# Patient Record
Sex: FEMALE | Race: WHITE | Employment: FULL TIME | URBAN - METROPOLITAN AREA
[De-identification: names, ages, dates, MRNs, and addresses within clinical notes are randomized per-mention and may not be internally consistent; named-entity substitution may affect disease eponyms.]

---

## 2018-03-07 PROBLEM — R60.1 ANASARCA: Status: ACTIVE | Noted: 2018-03-07

## 2018-06-14 ENCOUNTER — HOSPITAL ENCOUNTER (OUTPATIENT)
Dept: PHYSICAL THERAPY | Age: 48
Discharge: HOME | End: 2018-06-14
Payer: COMMERCIAL

## 2018-06-14 PROCEDURE — 97140 MANUAL THERAPY 1/> REGIONS: CPT

## 2018-06-14 PROCEDURE — 97161 PT EVAL LOW COMPLEX 20 MIN: CPT

## 2018-06-14 NOTE — PROGRESS NOTES
[facility] [address]  [address] [facility] [address] [facility], [place] [facility] [place], [address] - Phone: [phone]  Fax: [phone] / [address] [facility]  Patient Name: Wilfrido Mesa : 1970   Medical   Diagnosis: Leg pain [M79.606] Treatment Diagnosis: B diabetic neuropathy of feet   Onset Date:      Referral Source: Charis Pineda MD Start of Care [facility]): 2018   Prior Hospitalization: See medical history Provider #: [id number]   Prior Level of Function: Chronic, worsening pain in feet   Comorbidities: HBP, Thyroid problems, DM, Depression   Medications: Verified on Patient Summary List   The Plan of Care and following information is based on the information from the initial evaluation.   ==============================================================================  Assessment / larry information:   Wilfrido Mesa is a 50 y.o. female with a chief c/o constant B foot pain, up to 8/10 and B foot sensation changes (pins/needles). She reports insidious onset of B foot pain, about 3 years ago. Her pain has been increasing. Pain is worse in the evening (limiting sleep), or with prolonged standing. Her basic foot structure looks good, but with excessive L foot subtalar inversion, lacking proper eversion. Her calf length is also limited, with limited AROM/PROM with DF, with 5/12 degrees of DF L and 7/14 degrees of DF R. Today we did DTM to B calves and feet, then stretched her calves, which reduced her pain.   The patient would benefit from skilled physical therapy at this time.  ===========================================================================================  Eval Complexity: History LOW Complexity : Zero comorbidities / personal factors that will impact the outcome / POC;  Examination  MEDIUM Complexity : 3 Standardized tests and measures addressing body structure, function, activity limitation and / or participation in recreation ; Presentation LOW Complexity : Stable, uncomplicated ;  Decision Making MEDIUM Complexity : FOTO score of 26-74; Overall Complexity LOW   Problem List: pain affecting function, decrease ROM, impaired gait/ balance, decrease ADL/ functional abilitiies, decrease activity tolerance and decrease flexibility/ joint mobility   Treatment Plan may include any combination of the following: Therapeutic exercise, Therapeutic activities, Neuromuscular re-education, Physical agent/modality, Gait/balance training, Manual therapy and Patient education  Patient / Family readiness to learn indicated by: asking questions, trying to perform skills and interest  Persons(s) to be included in education: patient (P)  Barriers to Learning/Limitations: None  Measures taken:    Patient Goal (s): \"less foot pain\"   Patient self reported health status: good  Rehabilitation Potential: good   Short Term Goals: To be accomplished in  2  weeks:  1. Pt compliant with stretching HEP  2. Decrease max B calf/foot pain to < = 5/10    Long Term Goals: To be accomplished in  4-5  weeks:  1. All ADLs without B foot pain > 3/10  2. Pt able to sleep through the night without B foot pain awakening her  3. Pt to report GROC of >= +4, to indicate increased function  4. Patient Independent with HEP/selfcare   Frequency / Duration:   Patient to be seen 2-3  times per week for 4-8  weeks:  Patient / Caregiver education and instruction: self care, activity modification and exercises  Therapist Signature: Prem Contreras PT, MDT Date: 1/69/8269   Certification Period: NA Time: 4:59 PM   ===========================================================================================  I certify that the above Physical Therapy Services are being furnished while the patient is under my care. I agree with the treatment plan and certify that this therapy is necessary.     Physician Signature:        Date:       Time:     Please sign and return to [facility] [place] or you may fax the signed copy to [phone]. Thank you.

## 2018-06-14 NOTE — PROGRESS NOTES
PHYSICAL THERAPY - DAILY TREATMENT NOTE    Patient Name: Michael Smith        Date: 2018  : 1970   YES Patient  Verified  Visit #:     Insurance: Payor: /      In time: 4:05 Out time: 4:45   Total Treatment Time: 40     Medicare Time Tracking (below)   Total Timed Codes (min):  20 1:1 Treatment Time:  40     TREATMENT AREA =  B feet    SUBJECTIVE    Pain Level (on 0 to 10 scale):  6  / 10   Medication Changes/New allergies or changes in medical history, any new surgeries or procedures? NO    If yes, update Summary List   Subjective Functional Status/Changes:  []  No changes reported     See eval          OBJECTIVE    Modality rationale:       min [] Estim, type:                                          []  att     []  unatt     []  w/US     []  w/ice    []  w/heat    min []  Mechanical Traction: type/lbs                                               []  pro   []  sup   []  int   []  cont    min []  Ultrasound, settings/location:      min []  Iontophoresis:  []  take home patch w/ dexamethazone    min                                []  in clinic w/ dexamethazone    min []  Ice     []  Heat     position:     min []  Other:      5 min Therapeutic Exercise:  [x]  See flow sheet   []  Other:      []  Added:     to improve (function):    []  Changed:     to improve (function):       min Therapeutic Activity:      15 min Manual Therapy: DTM to calves, B ankle DF stretch, DTM to feet       min Gait Training:       min Patient Education:  YES  Reviewed HEP   []  Progressed/Changed HEP based on:         Other Objective/Functional Measures:    See eval     Post Treatment Pain Level (on 0 to 10) scale:   4  / 10     ASSESSMENT    []  See Progress Note/Recertification   Patient will continue to benefit from skilled therapy to address remaining functional deficits: see eval   Progress toward goals / Updated goals:    -     PLAN    [x]  Upgrade activities as tolerated YES Continue plan of care   [] Discharge due to :    []  Other:      Therapist: Bessie Jimenez PT    Date: 6/14/2018 Time: 8:13 AM

## 2018-06-18 ENCOUNTER — HOSPITAL ENCOUNTER (OUTPATIENT)
Dept: PHYSICAL THERAPY | Age: 48
Discharge: HOME | End: 2018-06-18
Payer: COMMERCIAL

## 2018-06-18 PROCEDURE — 97140 MANUAL THERAPY 1/> REGIONS: CPT

## 2018-06-18 PROCEDURE — 97110 THERAPEUTIC EXERCISES: CPT

## 2018-06-18 NOTE — PROGRESS NOTES
PHYSICAL THERAPY - DAILY TREATMENT NOTE      Patient Name: Mayela Going        Date: 2018  : 1970   YES Patient  Verified  Visit #:     Insurance: Payor: Carmen Severance / Plan: [facility] PPO / Product Type: PPO /      In time: 2:35 Out time: 3:15   Total Treatment Time: 40     Medicare Time Tracking (below)   Total Timed Codes (min):  n/a 1:1 Treatment Time:  n/a     TREATMENT AREA =  Leg pain [M79.606]    SUBJECTIVE    Pain Level (on 0 to 10 scale):  2  / 10   Medication Changes/New allergies or changes in medical history, any new surgeries or procedures? NO    If yes, update Summary List   Subjective Functional Status/Changes:  []  No changes reported       Functional improvements: sleep  Functional impairments: standing         OBJECTIVE    25 min Therapeutic Exercise:  [x]  See flow sheet   Rationale:      increase ROM and increase strength to improve the patients ability to stand     15 min Manual Therapy: Technique:      [] S/DTM []IASTM []PROM [] Passive Stretching   []manual TPR    []Jt manipulation:Gr I [] II []  III [] IV[] V[]  Treatment Area:  Prone DTM bilateral gastroc/solues, plantar foot   Rationale:      decrease pain and increase tissue extensibility to improve patient's ability to stand       min Patient Education:  YES  Reviewed HEP   []  Progressed/Changed HEP based on: Other Objective/Functional Measures:    Patient able to tolerate therex without symptom exacerbation.      Post Treatment Pain Level (on 0 to 10) scale:   0.5  / 10     ASSESSMENT    Assessment/Changes in Function:     Patient reported improved stiffness in LEs.     []  See Progress Note/Recertification   Patient will continue to benefit from skilled PT services to modify and progress therapeutic interventions, address functional mobility deficits, address ROM deficits, address strength deficits, analyze and address soft tissue restrictions, analyze and cue movement patterns, analyze and modify body mechanics/ergonomics and assess and modify postural abnormalities to attain remaining goals. to attain remaining goals. Progress toward goals / Updated goals:    Progressing toward ROM goals.      PLAN    [x]  Upgrade activities as tolerated YES Continue plan of care   []  Discharge due to :    []  Other:      Therapist: Frankie Gonsalves PT    Date: 6/18/2018 Time: 2:55 PM   Future Appointments  Date Time Provider Trang Lee   6/21/2018 2:30 PM Constantino Crane, PT [facility] AT [facility]   6/26/2018 9:30 AM Shakeel Mclean PT [facility] AT [facility]   6/28/2018 12:30 PM Christelle Gentile, PT [facility] AT [facility]   7/2/2018 2:00 PM Frankie Gonsalves, PT [facility] AT [facility]   7/5/2018 12:30 PM Christelle Gentile PT [facility] AT [facility]   7/9/2018 2:00 PM Frankie Gonsalves PT [facility] AT [facility]   7/12/2018 12:30 PM Christelle Gentile PT RE[facility]ENTER AT [facility]

## 2018-06-21 ENCOUNTER — HOSPITAL ENCOUNTER (OUTPATIENT)
Dept: PHYSICAL THERAPY | Age: 48
Discharge: HOME | End: 2018-06-21
Payer: COMMERCIAL

## 2018-06-21 PROCEDURE — 97110 THERAPEUTIC EXERCISES: CPT

## 2018-06-21 PROCEDURE — 97140 MANUAL THERAPY 1/> REGIONS: CPT

## 2018-06-21 NOTE — PROGRESS NOTES
PHYSICAL THERAPY - DAILY TREATMENT NOTE      Patient Name: Mau Ruff        Date: 2018  : 1970   YES Patient  Verified  Visit #:   3   of   12  Insurance: Payor: Juju Bouquet / Plan: [facility] PPO / Product Type: PPO /      In time: 2:42 Out time: 3:32   Total Treatment Time: 50       TREATMENT AREA = Leg pain [M79.606]    SUBJECTIVE    Pain Level (on 0 to 10 scale):  3   10   Medication Changes/New allergies or changes in medical history, any new surgeries or procedures? NO    If yes, update Summary List   Subjective Functional Status/Changes:  []  No changes reported     No significant changes in function       OBJECTIVE    35 min Therapeutic Exercise:  [x]  See flow sheet   Rationale:      increase ROM and increase strength to improve the patients ability to perform all LE ADL's       15 min Manual Therapy:  B gastroc/soleus/plantar foot STM   Rationale:      decrease pain and increase tissue extensibility to improve patient's ability to perform all LE ADL's     Throughout Tx min Patient Education:  YES  Reviewed HEP   []  Progressed/Changed HEP based on: Other Objective/Functional Measures:    Increased reps of prone hip extension, S/l hip ABD, clams, bridges, and squats     Post Treatment Pain Level (on 0 to 10) scale:   2   10     ASSESSMENT    Assessment/Changes in Function:     No increased pain with progression of therex     []  See Progress Note/Recertification   Patient will continue to benefit from skilled PT services to modify and progress therapeutic interventions, address functional mobility deficits, address ROM deficits, address strength deficits, analyze and address soft tissue restrictions, analyze and cue movement patterns and analyze and modify body mechanics/ergonomics to attain remaining goals.    Progress toward goals / Updated goals:    Continue manual to meet all pain goals     PLAN    [x]  Upgrade activities as tolerated YES Continue plan of care   []  Discharge due to :    []  Other:      Therapist: Jimmy Hawley PT    Date: 6/21/2018 Time: 3:33 PM   Future Appointments  Date Time Provider Trang Lee   6/26/2018 9:30 AM Yanci Abreu, PT REHAB [facility]ENTER AT [facility]   6/28/2018 12:30 PM Nunu Cota, PT [facility]ENTER AT [facility]   7/2/2018 2:00 PM Wolfgang Mortimer, PT [facility]ENTER AT [facility]   7/5/2018 12:30 PM Nunu Cota PT REHAB CENTER AT [facility]   7/9/2018 2:00 PM Wolfgang Mortimer, PT [facility] AT [facility]   7/12/2018 12:30 PM Nunu Cota PT REHAB CENTER AT [facility]

## 2018-06-26 ENCOUNTER — HOSPITAL ENCOUNTER (OUTPATIENT)
Dept: PHYSICAL THERAPY | Age: 48
Discharge: HOME | End: 2018-06-26
Payer: COMMERCIAL

## 2018-06-26 PROCEDURE — 97110 THERAPEUTIC EXERCISES: CPT

## 2018-06-26 PROCEDURE — 97140 MANUAL THERAPY 1/> REGIONS: CPT

## 2018-06-26 NOTE — PROGRESS NOTES
PHYSICAL THERAPY - DAILY TREATMENT NOTE      Patient Name: Velma Sanchez        Date: 2018  : 1970   YES Patient  Verified  Visit #:     Insurance: Payor: Margarita Dejesus / Plan: [facility] PPO / Product Type: PPO /      In time: 9:56 Out time: 10:55   Total Treatment Time: 59       TREATMENT AREA = Leg pain [M79.606]    SUBJECTIVE    Pain Level (on 0 to 10 scale):  2-3  / 10   Medication Changes/New allergies or changes in medical history, any new surgeries or procedures? NO    If yes, update Summary List   Subjective Functional Status/Changes:  []  No changes reported     No significant changes in function       OBJECTIVE    39 min Therapeutic Exercise:  [x]  See flow sheet   Rationale:      increase ROM and increase strength to improve the patients ability to perform all LE ADL's       20 min Manual Therapy:  B gastroc/soleus IASTM. B medial arch foot STM   Rationale:      decrease pain to improve patient's ability to perform all LE ADL's    x min Patient Education:  YES  Reviewed HEP   []  Progressed/Changed HEP based on: Other Objective/Functional Measures: Tolerated and completed all therex     Post Treatment Pain Level (on 0 to 10) scale:   2  / 10     ASSESSMENT    Assessment/Changes in Function:     No increased pain during therex     []  See Progress Note/Recertification   Patient will continue to benefit from skilled PT services to modify and progress therapeutic interventions, address functional mobility deficits, address ROM deficits, address strength deficits, analyze and address soft tissue restrictions, analyze and cue movement patterns, analyze and modify body mechanics/ergonomics and assess and modify postural abnormalities to attain remaining goals.    Progress toward goals / Updated goals:    Continue strengthening to meet all goals     PLAN    [x]  Upgrade activities as tolerated YES Continue plan of care   []  Discharge due to :    [] Other:      Therapist: Suzanne Devries PT    Date: 6/26/2018 Time: 11:40 AM   Future Appointments  Date Time Provider Trang Lee   6/28/2018 12:30 PM Elizabeth Sharma, PT REHAB CENTER AT [address]   7/2/2018 2:00 PM Susan Willis, PT REHAB CENTER AT [address]   7/5/2018 12:30 PM Elizabeth Sharma, PT REHAB CENTER AT [address]   7/9/2018 2:00 PM Susan Pedro, PT REHAB CENTER AT [address]   7/12/2018 12:30 PM Elizabeth Sharma, PT REHAB CENTER AT [address]

## 2018-06-28 ENCOUNTER — HOSPITAL ENCOUNTER (OUTPATIENT)
Dept: PHYSICAL THERAPY | Age: 48
Discharge: HOME | End: 2018-06-28
Payer: COMMERCIAL

## 2018-06-28 PROCEDURE — 97140 MANUAL THERAPY 1/> REGIONS: CPT

## 2018-06-28 PROCEDURE — 97110 THERAPEUTIC EXERCISES: CPT

## 2018-07-02 ENCOUNTER — HOSPITAL ENCOUNTER (OUTPATIENT)
Dept: PHYSICAL THERAPY | Age: 48
Discharge: HOME | End: 2018-07-02
Payer: COMMERCIAL

## 2018-07-02 PROCEDURE — 97110 THERAPEUTIC EXERCISES: CPT

## 2018-07-02 PROCEDURE — 97140 MANUAL THERAPY 1/> REGIONS: CPT

## 2018-07-02 NOTE — PROGRESS NOTES
PHYSICAL THERAPY - DAILY TREATMENT NOTE      Patient Name: Isiah Lopez        Date: 2018  : 1970   YES Patient  Verified  Visit #:     Insurance: Payor: Tara Beard / Plan: [facility] PPO / Product Type: PPO /      In time: 2:05 Out time: 2:40   Total Treatment Time: 35     Medicare Time Tracking (below)   Total Timed Codes (min):  n/a 1:1 Treatment Time:  n/a     TREATMENT AREA =  Leg pain [M79.606]    SUBJECTIVE    Pain Level (on 0 to 10 scale):  2  / 10   Medication Changes/New allergies or changes in medical history, any new surgeries or procedures? NO    If yes, update Summary List   Subjective Functional Status/Changes:  []  No changes reported       Functional improvements: sleep  Functional impairments: standing         OBJECTIVE    25 min Therapeutic Exercise:  [x]  See flow sheet   Rationale:      increase ROM and increase strength to improve the patients ability to stand     10 min Manual Therapy: Technique:      [] S/DTM []IASTM []PROM [] Passive Stretching   []manual TPR    []Jt manipulation:Gr I [] II []  III [] IV[] V[]  Treatment Area:  DTM bilateral gastoc/soleus, plantar fascia   Rationale:      decrease pain, increase ROM and increase tissue extensibility to improve patient's ability to stand     min Patient Education:  YES  Reviewed HEP   []  Progressed/Changed HEP based on: Other Objective/Functional Measures:    Patient reporting overall improvement in foot pain. Post Treatment Pain Level (on 0 to 10) scale:   3  / 10     ASSESSMENT    Assessment/Changes in Function:     Patient increasing workout routine at gym with improvements in mobility noted.      []  See Progress Note/Recertification   Patient will continue to benefit from skilled PT services to modify and progress therapeutic interventions, address functional mobility deficits, address ROM deficits, address strength deficits, analyze and address soft tissue restrictions, analyze and cue movement patterns, analyze and modify body mechanics/ergonomics and assess and modify postural abnormalities to attain remaining goals. to attain remaining goals. Progress toward goals / Updated goals:    Progressing toward pain goals.      PLAN    [x]  Upgrade activities as tolerated YES Continue plan of care   []  Discharge due to :    []  Other:      Therapist: Silvia Prescott PT    Date: 7/2/2018 Time: 2:28 PM   Future Appointments  Date Time Provider Trang Lee   7/5/2018 12:30 PM Barrie Moore PT [facility] AT [facility]   7/9/2018 2:00 PM Silvia Prescott PT [facility] AT [facility]   7/12/2018 12:30 PM Barrie Moore PT [facility] AT [facility]

## 2018-07-05 ENCOUNTER — HOSPITAL ENCOUNTER (OUTPATIENT)
Dept: PHYSICAL THERAPY | Age: 48
Discharge: HOME | End: 2018-07-05
Payer: COMMERCIAL

## 2018-07-05 PROCEDURE — 97140 MANUAL THERAPY 1/> REGIONS: CPT

## 2018-07-05 PROCEDURE — 97110 THERAPEUTIC EXERCISES: CPT

## 2018-07-05 NOTE — PROGRESS NOTES
PHYSICAL THERAPY - DAILY TREATMENT NOTE    Patient Name: Jl Cedeno        Date: 2018  : 1970    Patient  Verified: YES  Visit #:     Insurance: Payor: BLUE CROSS / Plan: [facility] PPO / Product Type: PPO /      In time: 12:30 Out time: 1:30   Total Treatment Time: 60     Medicare Time Tracking (below)   Total Timed Codes (min):  60 1:1 Treatment Time:  25     TREATMENT AREA/ DIAGNOSIS = Leg pain [M79.606]    SUBJECTIVE  Pain Level (on 0 to 10 scale):  4  / 10   Medication Changes/New allergies or changes in medical history, any new surgeries or procedures? NO    If yes, update Summary List   Subjective Functional Status/Changes:  [x]  No changes reported    Functional improvement less pain then when I started   Functional limitation       OBJECTIVE      15 min Manual Therapy: IASTM/DTM to B calves and feet, passive ankle DF     Rationale:      decrease pain, increase ROM and increase tissue extensibility to improve patient's ability to perform ADLs without pain    45 min Therapeutic Exercise:  [x]  See flow sheet   Rationale:      increase ROM and increase strength to improve the patients ability to perform ADLs without pain          min Patient Education:  Yes    [x] Reviewed HEP   []  Progressed/Changed HEP based on: Other Objective/Functional Measures:    Pt with less pain since starting therapy   Post Treatment Pain Level (on 0 to 10) scale:   3  / 10     ASSESSMENT  Assessment/Changes in Function:      pt learning a good HEP     []  See Progress Note/Recertification   Patient will continue to benefit from skilled PT services to modify and progress therapeutic interventions, address functional mobility deficits, address ROM deficits, address strength deficits, analyze and address soft tissue restrictions, analyze and cue movement patterns, analyze and modify body mechanics/ergonomics and assess and modify postural abnormalities to attain remaining goals. Progress toward goals / Updated goals:    Less pain     PLAN  [x]  Upgrade activities as tolerated YES Continue plan of care   []  Discharge due to :    []  Other:      Therapist: Radha Staples PT    Date: 7/5/2018 Time: 1:36 PM        Future Appointments  Date Time Provider Trang Lee   7/9/2018 2:00 PM Akosua Lopez, PT [facility] AT [facility]   7/12/2018 12:30 PM Radha Staples, PT [facility] AT [facility]

## 2018-07-12 ENCOUNTER — HOSPITAL ENCOUNTER (OUTPATIENT)
Dept: PHYSICAL THERAPY | Age: 48
Discharge: HOME | End: 2018-07-12
Payer: COMMERCIAL

## 2018-07-12 PROCEDURE — 97140 MANUAL THERAPY 1/> REGIONS: CPT

## 2018-07-12 PROCEDURE — 97110 THERAPEUTIC EXERCISES: CPT

## 2018-07-12 NOTE — PROGRESS NOTES
PHYSICAL THERAPY - DAILY TREATMENT NOTE    Patient Name: Teena Murillo        Date: 2018  : 1970    Patient  Verified: YES  Visit #:     Insurance: Payor: BLUE CROSS / Plan: [facility] PPO / Product Type: PPO /      In time: 12:20 Out time: 1:25   Total Treatment Time: 65     Medicare Time Tracking (below)   Total Timed Codes (min):  65 1:1 Treatment Time:  40     TREATMENT AREA/ DIAGNOSIS = Leg pain [M79.606]    SUBJECTIVE  Pain Level (on 0 to 10 scale):  1  / 10   Medication Changes/New allergies or changes in medical history, any new surgeries or procedures? NO    If yes, update Summary List   Subjective Functional Status/Changes:  []  No changes reported     Functional improvement  i'm not getting woken up with leg pain  Functional limitation       OBJECTIVE      20 min Manual Therapy: IASTM/DTM to LEs form knees distally and feet, passive calf stretch   Rationale:      decrease pain, increase ROM and increase tissue extensibility to improve patient's ability to perform ADLs without pain    45 min Therapeutic Exercise:  [x]  See flow sheet   Rationale:      increase ROM, increase strength and improve balance to improve the patients ability to perform ADLs without pain          min Patient Education:  Yes    [x] Reviewed HEP   []  Progressed/Changed HEP based on: Other Objective/Functional Measures:    See PN   Post Treatment Pain Level (on 0 to 10) scale:   1  / 10     ASSESSMENT  Assessment/Changes in Function:     See PN     [x]  See Progress Note/Recertification   Patient will continue to benefit from skilled PT services to modify and progress therapeutic interventions, address functional mobility deficits, address ROM deficits, address strength deficits, analyze and address soft tissue restrictions, analyze and cue movement patterns, analyze and modify body mechanics/ergonomics and assess and modify postural abnormalities to attain remaining goals. Progress toward goals / Updated goals:    See PN     PLAN  [x]  Upgrade activities as tolerated YES Continue plan of care   []  Discharge due to :    []  Other:      Therapist: Luisa Chu PT    Date: 7/12/2018 Time: 1:22 PM        Future Appointments  Date Time Provider Trang Lee   7/17/2018 10:30 AM Kaiden Steiner PT RE[facility] AT [facility]

## 2018-07-12 NOTE — PROGRESS NOTES
[facility] PHYSICAL THERAPY AT [facility] [address]  Phone: [phone]  Fax: [phone]  PROGRESS NOTE  Patient Name: Linn Leslie : 1970   Treatment/Medical Diagnosis: Leg pain [M79.606]   Referral Source: Tabatha Pearl MD     Date of Initial Visit: 18 Attended Visits: 8 Missed Visits: 0     SUMMARY OF TREATMENT  Manual therapy, including massage to legs and passive stretching. Core stability program and LE strengthening/stretching program.  Patient education on proper nutrition (as related to diabetic neuropathy) and HEP. CURRENT STATUS  The patient has progressed well since starting therapy. She can now sleep through the night without being awakened by pain. She is learning a good HEP. Goal/Measure of Progress Goal Met? 1. All ADLs without B foot pain > 3/10   Status at last Eval: Pain up to 8/10 Current Status: pain up to 8/10 no   2. Pt able to sleep through the night without pain awakening her   Status at last Eval: Nightly disruption of sleep by foot pain Current Status: Not being awakened by foot pain  yes   3. Pt to report GROC of >= +4, to indicate increased function    Status at last Eval: - Current Status: +3, somewhat better progressing   4. Pt independent with HEP   Status at last Eval: No HEP  Current Status: learning HEP progressing     New Goals to be achieved in __4-5__  weeks:  1. All ADLs without B foot pain > 3/10    2. Continue sleeping through the night without being awakened by foot pain    3. Pt to report GROC of >=+4, to indicate increased function    4. Independent with HEP      RECOMMENDATIONS  Continue PT 2x/week x 4-5 weeks   If you have any questions/comments please contact us directly at [phone]. Thank you for allowing us to assist in the care of your patient. Therapist Signature:  Barrie Moore PT, MDT Date: 2018     Time: 1:25 PM   NOTE TO PHYSICIAN: PLEASE COMPLETE THE ORDERS BELOW AND FAX TO   [facility] [place]: [phone]. If you are unable to process this request in 24 hours please contact our office: [phone].    ___ I have read the above report and request that my patient continue as recommended.   ___ I have read the above report and request that my patient continue therapy with the following changes/special instructions:_________________________________________________________   ___ I have read the above report and request that my patient be discharged from therapy.      Physician Signature:        Date:       Time:

## 2018-07-19 ENCOUNTER — HOSPITAL ENCOUNTER (OUTPATIENT)
Dept: PHYSICAL THERAPY | Age: 48
Discharge: HOME | End: 2018-07-19
Payer: COMMERCIAL

## 2018-07-19 PROCEDURE — 97140 MANUAL THERAPY 1/> REGIONS: CPT

## 2018-07-19 PROCEDURE — 97110 THERAPEUTIC EXERCISES: CPT

## 2018-07-19 NOTE — PROGRESS NOTES
PHYSICAL THERAPY - DAILY TREATMENT NOTE      Patient Name: Israel Rodriguez        Date: 2018  : 1970   YES Patient  Verified  Visit #:     Insurance: Payor: Gavi Benavidez / Plan: [facility] PPO / Product Type: PPO /      In time: 11:30 Out time: 12:43   Total Treatment Time: 73       TREATMENT AREA = Leg pain [M79.606]    SUBJECTIVE    Pain Level (on 0 to 10 scale):  3   10   Medication Changes/New allergies or changes in medical history, any new surgeries or procedures? NO    If yes, update Summary List   Subjective Functional Status/Changes:  []  No changes reported     No significant changes in function       OBJECTIVE    43 min Therapeutic Exercise:  [x]  See flow sheet   Rationale:      increase ROM and increase strength to improve the patients ability to perform all LE ADL's       20 min Manual Therapy:  B gastroc/soleus/plantar foot IASTM   Rationale:      decrease pain and increase tissue extensibility to improve patient's ability to perform all LE ADL's    Throughout Tx min Patient Education:  YES  Reviewed HEP   []  Progressed/Changed HEP based on: Other Objective/Functional Measures: Tolerated and completed all therex     Post Treatment Pain Level (on 0 to 10) scale:   3  / 10     ASSESSMENT    Assessment/Changes in Function:     No increased pain during therex     []  See Progress Note/Recertification   Patient will continue to benefit from skilled PT services to modify and progress therapeutic interventions, address functional mobility deficits, address ROM deficits, address strength deficits, analyze and address soft tissue restrictions, analyze and cue movement patterns, analyze and modify body mechanics/ergonomics and assess and modify postural abnormalities to attain remaining goals.    Progress toward goals / Updated goals:    Continue strengthening to meet all goals     PLAN    [x]  Upgrade activities as tolerated YES Continue plan of care []  Discharge due to :    []  Other:      Therapist: Reyna Messer PT    Date: 7/19/2018 Time: 11:55 AM   No future appointments.

## 2018-07-24 ENCOUNTER — APPOINTMENT (OUTPATIENT)
Dept: PHYSICAL THERAPY | Age: 48
End: 2018-07-24
Payer: COMMERCIAL

## 2018-07-31 ENCOUNTER — HOSPITAL ENCOUNTER (OUTPATIENT)
Dept: PHYSICAL THERAPY | Age: 48
End: 2018-07-31
Payer: COMMERCIAL

## 2018-08-22 NOTE — PROGRESS NOTES
[facility] AT [facility] [address] [place] [facility]  Phone: [phone]  Fax: [phone] SUMMARY  Patient Name: Juno Martin : 1970   Treatment/Medical Diagnosis: Leg pain [M79.606]   Referral Source: Jaclyn Rm MD     Date of Initial Visit: 18 Attended Visits: 9 Missed Visits: 2     SUMMARY OF TREATMENT  Juno Martin has been seen at our clinic 2x/wk for a total of 9  visits. Pt treatment has consisted of therapeutic exercise for ankle/foot A/PROM and strengthening in all planes, pelvic girdle mm strengthening, balance training, and manual therapy. CURRENT STATUS  Unknown secondary to Pt non-compliance with PT attendendance  GOAL STATUS  Unable to formally reassess progress toward goals secondary to pt non-compliance with continued PT attendance>1 month (last visit 18). RECOMMENDATIONS  Discontinue therapy due to lack of attendance or compliance. If you have any questions/comments please contact us directly at [phone]. Thank you for allowing us to assist in the care of your patient.     Therapist Signature: Sergey Stevens PT Date: 18     Time: 11:20 AM

## 2019-05-13 PROBLEM — D25.9 FIBROID UTERUS: Status: ACTIVE | Noted: 2019-05-13
